# Patient Record
Sex: MALE | Race: WHITE | URBAN - METROPOLITAN AREA
[De-identification: names, ages, dates, MRNs, and addresses within clinical notes are randomized per-mention and may not be internally consistent; named-entity substitution may affect disease eponyms.]

---

## 2021-05-31 ENCOUNTER — RECORDS - HEALTHEAST (OUTPATIENT)
Dept: ADMINISTRATIVE | Facility: CLINIC | Age: 71
End: 2021-05-31

## 2021-06-01 ENCOUNTER — RECORDS - HEALTHEAST (OUTPATIENT)
Dept: ADMINISTRATIVE | Facility: CLINIC | Age: 71
End: 2021-06-01

## 2022-08-03 ENCOUNTER — TRANSFERRED RECORDS (OUTPATIENT)
Dept: HEALTH INFORMATION MANAGEMENT | Facility: CLINIC | Age: 72
End: 2022-08-03

## 2024-05-02 DIAGNOSIS — R60.9 EDEMA: Primary | ICD-10-CM

## 2025-02-17 ENCOUNTER — TELEPHONE (OUTPATIENT)
Dept: ORTHOPEDICS | Facility: CLINIC | Age: 75
End: 2025-02-17
Payer: COMMERCIAL

## 2025-02-17 ENCOUNTER — TRANSFERRED RECORDS (OUTPATIENT)
Dept: HEALTH INFORMATION MANAGEMENT | Facility: CLINIC | Age: 75
End: 2025-02-17
Payer: COMMERCIAL

## 2025-02-17 ENCOUNTER — MEDICAL CORRESPONDENCE (OUTPATIENT)
Dept: HEALTH INFORMATION MANAGEMENT | Facility: CLINIC | Age: 75
End: 2025-02-17
Payer: COMMERCIAL

## 2025-02-17 NOTE — TELEPHONE ENCOUNTER
Other: Patient is calling in for scheduling, patient has mass in left knee. Images and notes are incoming from [facility]- sent today per patient. Please call when able.      Could we send this information to you in [email or web address] or would you prefer to receive a phone call?:   Patient would prefer a phone call   Okay to leave a detailed message?: Yes at Home number on file [phone] (home)

## 2025-02-18 NOTE — PROGRESS NOTES
[facility] Physicians, Orthopaedic Oncology Surgery Consultation  by Nasim Narvaez M.D.    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  Rule out tenosynovial giant cell tumor (PVNS) versus occult synovial base disorder such as synovial chondromatosis or connective tissue disease.     Plan:  MRI examination for serial comparison to prior MRI of 2023.  If synovial base masses are still present or enlarging, then tissue biopsy is warranted.  Best done arthroscopically unless needle biopsy is possible.  Patient return in person or virtually for discussion of MRI results prior to decision about next steps.  We discussed the differential diagnosis of synovial based masses.      Nasim Narvaez MD  [facility] Professor  Oncology and Adult Reconstructive Surgery  Dept Orthopaedic Surgery, [facility]  [phone] office, [phone] pager  [email or web address]    This note was created using dictation software and may contain errors.  Please contact the creator for any clarifications that are needed.            History of Present Illness:   74 year old male  chief complaint    Right knee pain of 3 years duration, persistent, patient underwent MRI examination in 2023 and saw his primary care physicians assistant, Jesse Cao, who advised referral to [facility] orthopedics.  He saw an unknown physician at that point and was referred to [facility] for evaluation.  Apparently, the evaluation did not take place and patient return to see ALEXANDRIA Chahal again who sent the patient to [facility] who saw Dr. Pedro Recinos who again advised referral to [facility] orthopedic oncology resulting in today's evaluation.    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    Current symptoms:  Problem: Right knee mass  Onset and duration: several years  Awakens from sleep due to sx's:  No  Precipitating Injury:  No    Other joints or sites painful:  Yes  Fever: No  Appetite change or weight loss: No  History of prior or existing cancer: Yes             Physical Exam:     EXAMINATION pertinent findings:   PSYCH: Pleasant, healthy-appearing, alert, oriented x3, cooperative. Normal mood and affect.  VITAL SIGNS: There were no vitals taken for this visit..  Reviewed nursing intake notes.   There is no height or weight on file to calculate BMI.  RESP: non labored breathing   ABD: benign, soft, non-tender, no acute peritoneal findings  SKIN: grossly normal   LYMPHATIC: grossly normal, no adenopathy, no extremity edema  NEURO: grossly normal , no motor deficits  VASCULAR: satisfactory perfusion of all extremities   MUSCULOSKELETAL:   Normal gait.  Hips symmetrically pain-free range of motion, full.  Right knee: No effusion.  No warmth.  Tenderness to palpation of the suprapatellar region.  No varus or valgus laxity or deformity.  Full extension with further flexion to 130 degrees.    Left knee, no effusion, no warmth.  0 to 135 degree range of motion without ligamentous laxity.           Data:   All laboratory data reviewed  All imaging studies reviewed by me    MRI examination 2023 demonstrates numerous synovial base masses posterior to the lateral femoral condyle and intercondylar notch region.  No intra-articular meniscal or cruciate ligament pathology identified.      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    Also see scanned health assessment forms.       Past Surgical History:     Past Surgical History:   Procedure Laterality Date    [facility] TOTAL HIP ARTHROPLASTY      Description: Total Hip Replacement;  Recorded: 03/23/2012;            Social History:     Social History     Socioeconomic History    Marital status:      Spouse name: Not on file    Number of children: Not on file    Years of education: Not on file    Highest education level: Not on file   Occupational History    Not on file   Tobacco Use    Smoking status: Not on file    Smokeless tobacco: Not on file   Substance and Sexual Activity    Alcohol use: Not on file    Drug use: Not on file    Sexual activity: Not on file   Other Topics Concern    Not on file   Social History Narrative    Not on file     Social Drivers of Health     Financial Resource Strain: Not on file   Food Insecurity: Not on file   Transportation Needs: Not on file   Physical Activity: Not on file   Stress: Not on file   Social Connections: Not on file   Interpersonal Safety: Not on file   Housing Stability: Not on file            Family History:     No family history on file.         Medications:     No current outpatient medications on file.     No current facility-administered medications for this visit.              Review of Systems:   A comprehensive 10 point review of systems (constitutional, ENT, cardiac, peripheral vascular, lymphatic, respiratory, GI, , Musculoskeletal, skin, Neurological) was performed and found to be negative except as described in this note.     See intake form completed by patient

## 2025-02-18 NOTE — TELEPHONE ENCOUNTER
Action February 18, 2025 9:49 AM MT   Action Taken Sent a request for records from [facility].  Sent a request for imaging from [facility].       DIAGNOSIS: RIGHT KNEE MASS   APPOINTMENT DATE: 02/24/2025   NOTES STATUS DETAILS   OFFICE NOTE from referring provider In process [facility]   OFFICE NOTE from other specialist Care Everywhere 02/09/2024 - Jesse Cao PA-C - [facility]   MRI In process [facility]:  08/28/2023, 08/17/2023 - Right Knee   XRAYS (IMAGES & REPORTS) In process [facility]:  02/17/2025 - RT Knee    [facility]:  08/08/2023 - RT Knee

## 2025-02-24 ENCOUNTER — OFFICE VISIT (OUTPATIENT)
Dept: ORTHOPEDICS | Facility: CLINIC | Age: 75
End: 2025-02-24
Payer: COMMERCIAL

## 2025-02-24 ENCOUNTER — PRE VISIT (OUTPATIENT)
Dept: ORTHOPEDICS | Facility: CLINIC | Age: 75
End: 2025-02-24

## 2025-02-24 VITALS — HEIGHT: 72 IN | WEIGHT: 226 LBS | BODY MASS INDEX: 30.61 KG/M2

## 2025-02-24 DIAGNOSIS — R22.41 KNEE MASS, RIGHT: Primary | ICD-10-CM

## 2025-02-24 DIAGNOSIS — Z85.9 HISTORY OF CANCER: ICD-10-CM

## 2025-02-24 PROCEDURE — 99204 OFFICE O/P NEW MOD 45 MIN: CPT | Performed by: ORTHOPAEDIC SURGERY

## 2025-02-24 RX ORDER — VITAMIN B COMPLEX
25 TABLET ORAL
COMMUNITY

## 2025-02-24 RX ORDER — BUPROPION HYDROCHLORIDE 100 MG/1
100 TABLET ORAL
COMMUNITY

## 2025-02-24 RX ORDER — FUROSEMIDE 20 MG/1
TABLET ORAL
COMMUNITY
Start: 2025-02-11

## 2025-02-24 RX ORDER — ATORVASTATIN CALCIUM 10 MG/1
TABLET, FILM COATED ORAL
COMMUNITY

## 2025-02-24 RX ORDER — MIRTAZAPINE 30 MG/1
TABLET, FILM COATED ORAL
COMMUNITY

## 2025-02-24 RX ORDER — CELECOXIB 200 MG/1
200 CAPSULE ORAL
COMMUNITY

## 2025-02-24 RX ORDER — FINASTERIDE 5 MG/1
TABLET, FILM COATED ORAL
COMMUNITY
Start: 2024-07-23

## 2025-02-24 RX ORDER — FLUTICASONE PROPIONATE AND SALMETEROL 250; 50 UG/1; UG/1
1 POWDER RESPIRATORY (INHALATION)
COMMUNITY
Start: 2023-08-08

## 2025-02-24 RX ORDER — GABAPENTIN 100 MG/1
200 CAPSULE ORAL
COMMUNITY
Start: 2025-02-11

## 2025-02-24 NOTE — LETTER
2/24/2025      Hayden Cardona  [address]  [place] [address]      Dear Colleague,    Thank you for referring your patient, Hayden Cardona, to the [facility] [place]. Please see a copy of my visit note below.        [facility] Physicians, Orthopaedic Oncology Surgery Consultation  by Nasim Narvaez M.D.    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  Rule out tenosynovial giant cell tumor (PVNS) versus occult synovial base disorder such as synovial chondromatosis or connective tissue disease.     Plan:  MRI examination for serial comparison to prior MRI of 2023.  If synovial base masses are still present or enlarging, then tissue biopsy is warranted.  Best done arthroscopically unless needle biopsy is possible.  Patient return in person or virtually for discussion of MRI results prior to decision about next steps.  We discussed the differential diagnosis of synovial based masses.      Nasim Narvaez MD  [facility] Family Professor  Oncology and Adult Reconstructive Surgery  Dept Orthopaedic Surgery, [facility]  [phone] office, [phone] pager  [email or web address]    This note was created using dictation software and may contain errors.  Please contact the creator for any clarifications that are needed.            History of Present Illness:   74 year old male  chief complaint    Right knee pain of 3 years duration, persistent, patient underwent MRI examination in 2023 and saw his primary care physicians assistant, Jesse Cao, who advised referral to [facility] orthopedics.  He saw an unknown physician at that point and was referred to [facility] for evaluation.  Apparently, the evaluation did not take place and patient return to see Jesse Cornejo  PA again who sent the patient to [facility] who saw Dr. Pedro Recinos who again advised referral to [facility] orthopedic oncology resulting in today's evaluation.    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    Current symptoms:  Problem: Right knee mass  Onset and duration: several years  Awakens from sleep due to sx's:  No  Precipitating Injury:  No    Other joints or sites painful:  Yes  Fever: No  Appetite change or weight loss: No  History of prior or existing cancer: Yes             Physical Exam:     EXAMINATION pertinent findings:   PSYCH: Pleasant, healthy-appearing, alert, oriented x3, cooperative. Normal mood and affect.  VITAL SIGNS: There were no vitals taken for this visit..  Reviewed nursing intake notes.   There is no height or weight on file to calculate BMI.  RESP: non labored breathing   ABD: benign, soft, non-tender, no acute peritoneal findings  SKIN: grossly normal   LYMPHATIC: grossly normal, no adenopathy, no extremity edema  NEURO: grossly normal , no motor deficits  VASCULAR: satisfactory perfusion of all extremities   MUSCULOSKELETAL:   Normal gait.  Hips symmetrically pain-free range of motion, full.  Right knee: No effusion.  No warmth.  Tenderness to palpation of the suprapatellar region.  No varus or valgus laxity or deformity.  Full extension with further flexion to 130 degrees.    Left knee, no effusion, no warmth.  0 to 135 degree range of motion without ligamentous laxity.           Data:   All laboratory data reviewed  All imaging studies reviewed by me    MRI examination 2023 demonstrates numerous synovial base masses posterior to the lateral femoral condyle and intercondylar notch region.  No intra-articular meniscal or cruciate ligament pathology identified.      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    Also see scanned health assessment forms.       Past Surgical History:     Past Surgical History:   Procedure Laterality Date     [facility] TOTAL HIP ARTHROPLASTY      Description: Total Hip Replacement;  Recorded: 03/23/2012;            Social History:     Social History     Socioeconomic History     Marital status:      Spouse name: Not on file     Number of children: Not on file     Years of education: Not on file     Highest education level: Not on file   Occupational History     Not on file   Tobacco Use     Smoking status: Not on file     Smokeless tobacco: Not on file   Substance and Sexual Activity     Alcohol use: Not on file     Drug use: Not on file     Sexual activity: Not on file   Other Topics Concern     Not on file   Social History Narrative     Not on file     Social Drivers of Health     Financial Resource Strain: Not on file   Food Insecurity: Not on file   Transportation Needs: Not on file   Physical Activity: Not on file   Stress: Not on file   Social Connections: Not on file   Interpersonal Safety: Not on file   Housing Stability: Not on file            Family History:     No family history on file.         Medications:     No current outpatient medications on file.     No current facility-administered medications for this visit.              Review of Systems:   A comprehensive 10 point review of systems (constitutional, ENT, cardiac, peripheral vascular, lymphatic, respiratory, GI, , Musculoskeletal, skin, Neurological) was performed  and found to be negative except as described in this note.     See intake form completed by patient      Again, thank you for allowing me to participate in the care of your patient.        Sincerely,        Nasim Narvaez MD    Electronically signed

## 2025-03-10 NOTE — PROGRESS NOTES
Virtual Visit Details    Type of service:  Video Visit   Video Start Time:  1620  Video End Time:4:33 PM    Originating Location (pt. Location): Home    Distant Location (provider location):  On-site  Platform used for Video Visit: [facility], Orthopaedic Oncology Surgery Consultation  by Nasim Narvaez M.D.    Hayden Cardona MRN# [id number]    YOB: 1950     Requesting physician: Tulio Recinos MD [place] Dasha Cao PA-C       Diagnosis:  Right knee synovial mass versus cyst.    I met with Hayden abdi to discuss the MRI findings which reveal small effusion with a lateral sided cyst or mass present.  Diagnosis is unclear at this juncture.  He has had pain for 3 years in the knee joint.    I have recommended that we proceed with an arthroscopic examination, possible synovial biopsy, possible arthrotomy with excision of cyst or mass involving the lateral aspect of his knee joint.    We discussed the rationale for this recommendation, the need for diagnostic material.  We discussed risk benefits alternatives as well.    He would like to proceed with the surgery as outlined above.  Case request submitted.    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  Rule out tenosynovial giant cell tumor (PVNS) versus occult synovial base disorder such as synovial chondromatosis or connective tissue disease.     Plan:  MRI examination for serial comparison to prior MRI of 2023.  If synovial base masses are still present or enlarging, then tissue biopsy is warranted.  Best done arthroscopically unless needle biopsy is possible.  Patient return in person or virtually for discussion of MRI results prior to decision about  next steps.  We discussed the differential diagnosis of synovial based masses.      MD Tonia Orozco Family Professor  Oncology and Adult Reconstructive Surgery  Dept Orthopaedic Surgery, [facility]  [phone] office, [phone] pager  [email or web address]

## 2025-03-20 ENCOUNTER — VIRTUAL VISIT (OUTPATIENT)
Dept: ORTHOPEDICS | Facility: CLINIC | Age: 75
End: 2025-03-20
Attending: ORTHOPAEDIC SURGERY
Payer: COMMERCIAL

## 2025-03-20 DIAGNOSIS — R22.41 MASS OF KNEE, RIGHT: Primary | ICD-10-CM

## 2025-03-20 ASSESSMENT — PAIN SCALES - GENERAL: PAINLEVEL_OUTOF10: MILD PAIN (2)

## 2025-03-20 NOTE — LETTER
3/20/2025      Hayden Cardona  [address]  [place] [address]      Dear Colleague,    Thank you for referring your patient, Hayden Cardona, to the [facility] [place]. Please see a copy of my visit note below.    Virtual Visit Details    Type of service:  Video Visit   Video Start Time:  1620  Video End Time:4:33 PM    Originating Location (pt. Location): Home    Distant Location (provider location):  On-site  Platform used for Video Visit: [facility] Physicians, Orthopaedic Oncology Surgery Consultation  by Nasim Narvaez M.D.    Hayden Cardona MRN# [id number]    YOB: 1950     Requesting physician: Tulio Recinos MD [place] Dasha Cao PA-C       Diagnosis:  Right knee synovial mass versus cyst.    I met with Hayden abdi to discuss the MRI findings which reveal small effusion with a lateral sided cyst or mass present.  Diagnosis is unclear at this juncture.  He has had pain for 3 years in the knee joint.    I have recommended that we proceed with an arthroscopic examination, possible synovial biopsy, possible arthrotomy with excision of cyst or mass involving the lateral aspect of his knee joint.    We discussed the rationale for this recommendation, the need for diagnostic material.  We discussed risk benefits alternatives as well.    He would like to proceed with the surgery as outlined above.  Case request submitted.    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  Rule out tenosynovial giant cell tumor (PVNS) versus occult synovial base disorder such as synovial chondromatosis or connective tissue disease.     Plan:  MRI examination for serial comparison to  prior MRI of 2023.  If synovial base masses are still present or enlarging, then tissue biopsy is warranted.  Best done arthroscopically unless needle biopsy is possible.  Patient return in person or virtually for discussion of MRI results prior to decision about next steps.  We discussed the differential diagnosis of synovial based masses.      Nasim Narvaez MD  [facility] Family Professor  Oncology and Adult Reconstructive Surgery  Dept Orthopaedic Surgery, [facility]  [phone] office, [phone] pager  [email or web address]      Again, thank you for allowing me to participate in the care of your patient.        Sincerely,        Nasim Narvaez MD    Electronically signed

## 2025-03-20 NOTE — NURSING NOTE
Current patient location: [address] [place] [address]   [facility] [address]    Is the patient currently in the state of [place]? YES    Visit mode: VIDEO    If the visit is dropped, the patient can be reconnected by:VIDEO VISIT: Text to cell phone:   Telephone Information:   Mobile [phone]       Will anyone else be joining the visit? NO  (If patient encounters technical issues they should call [phone])    Are changes needed to the allergy or medication list? No    Are refills needed on medications prescribed by this physician? NO    Rooming Documentation:  Questionnaire(s) completed    Reason for visit: RECHECK    Anel NAIKF

## 2025-03-24 ENCOUNTER — TELEPHONE (OUTPATIENT)
Dept: ORTHOPEDICS | Facility: CLINIC | Age: 75
End: 2025-03-24
Payer: COMMERCIAL

## 2025-03-24 ENCOUNTER — MYC MEDICAL ADVICE (OUTPATIENT)
Dept: ORTHOPEDICS | Facility: CLINIC | Age: 75
End: 2025-03-24
Payer: COMMERCIAL

## 2025-03-24 NOTE — TELEPHONE ENCOUNTER
Other: Patient called and is ready to schedule surgery on his right knee. Please call patient back.     Could we send this information to you in [employer] or would you prefer to receive a phone call?:   Patient would prefer a phone call   Okay to leave a detailed message?: Yes at Cell number on file:    Telephone Information:   Mobile [phone]

## 2025-03-24 NOTE — TELEPHONE ENCOUNTER
Called patient to schedule surgery with Dr. Narvaez. Patient is interested in scheduling surgery on 4/8/25.    Patient will confirm that they have transportation available on that date and will call back or reply to [employer] message to confirm.    Rhonda  Perioperative   [phone]

## 2025-03-24 NOTE — TELEPHONE ENCOUNTER
Called patient to schedule surgery with Dr. Narvaez. Patient is currently driving and requested a call back.    Rhonda  Perioperative   [phone]

## 2025-03-24 NOTE — TELEPHONE ENCOUNTER
Patient is scheduled for surgery with Dr. Narvaez    Spoke with: patient    Date of Surgery: 4/8/25    Location: [facility] OR    Post op: 4/28/25    H&P: 4/2/25 PAC    Additional imaging/appointments: N/A    Surgery packet: received    Additional comments: N/A    Rhonda  Perioperative   [phone]

## 2025-03-25 NOTE — TELEPHONE ENCOUNTER
FUTURE VISIT INFORMATION      SURGERY INFORMATION:  Date: 25  Location: [facility] OR   Surgeon:  Nasim Narvaez MD   Anesthesia Type:  Choice  Procedure: ARTHROSCOPY KNEE WITH BIOPSY, SYNOVIUM, RIGHT KNEE; ARTHROTOMY, KNEE WITH EXCISION OF CYST OR MASS  Consult: 3/20/25    RECORDS REQUESTED FROM:       Primary Care Provider: Jesse Cao PA-C - [facility]     Pertinent Medical History: Hyperlipidemia     Most recent EKG+ Tracin24 - [facility]     Most recent ECHO: - [facility]     Most recent Cardiac Stress Test: 09

## 2025-03-25 NOTE — TELEPHONE ENCOUNTER
Teaching Flowsheet     Visit Type:     Time Start: 11:20am  Time End: 11:30am  Total Time Spent: 10 minutes    Surgeon: Dr. Narvaez  Location of Surgery (known or anticipated): [facility]  Type of Anesthesia: General    Pertinent Medical History: reviewed on the up to date problem list in the chart  Were medical conditions reviewed and appropriate for location? Yes  BMI: Obesity Grade I BMI 30-34.9    Relevant Diagnosis: Mass of knee, right   Teaching Topic: Arthroscopy with BIOPSY, SYNOVIUM, right KNEE, ARTHROTOMY, KNEE with excision of cyst or mass     Person(s) involved in teaching:   Patient  : No.     Caregiver//  Name: Man Cardona  Phone Number: [phone]   Relationship: Son  Consent to communicate on file No       Motivation Level:  Asks Questions: Yes  Eager to Learn: Yes  Cooperative: Yes  Receptive (willing/able to accept information): Yes  Any cultural factors/Confucianist beliefs that may influence understanding or compliance? No     Patient demonstrates understanding of the following:  Reason for the appointment, diagnosis and treatment plan: Yes  Knowledge of proper use of medications and conditions for which they are ordered (with special attention to potential side effects or drug interactions): Yes  Which situations necessitate calling provider and whom to contact: Yes     Teaching Concerns Addressed: all concerns addressed     Proper use and care of medical equip, care aids, etc.: Yes  Nutritional needs and diet plan: Yes  Pain management techniques: Yes  Wound Care: Yes  How and/when to access community resources: Yes  Need for pre-op with in 30 days: YES, to be done with PAC.      Does patient have difficulty getting a ride to appointments (post-ops, PT/OT): No     Instructional Materials Used/Given:  a surgery packet sent via [employer]. Instructed patient to buy or get two 8 ounces bottles of antiseptic surgical soap called 4% CHG. Common name brand of this soap are  Hibiclens and Exidine. I told them they can find this at their local pharmacy, clinic or retail store. If they have trouble finding it, I told them to ask their pharmacist to help them find a substitute.     - Important contact info/ phone numbers: emphasizing clinic number and after hours number  - Map/ location of surgery  - Showering instructions  - Stop light tool    Additionally the following was discussed with patient:  - Caregiver// listed above will be driving the patient to surgery and staying with them for 24 hours.       -Next step: surgery scheduled for 4/8 and pre-op scheduled with PAC for 4/2.

## 2025-04-01 ENCOUNTER — PREP FOR PROCEDURE (OUTPATIENT)
Dept: OTHER | Facility: CLINIC | Age: 75
End: 2025-04-01
Payer: COMMERCIAL

## 2025-04-02 ENCOUNTER — VIRTUAL VISIT (OUTPATIENT)
Dept: SURGERY | Facility: CLINIC | Age: 75
End: 2025-04-02
Payer: COMMERCIAL

## 2025-04-02 ENCOUNTER — ANESTHESIA EVENT (OUTPATIENT)
Dept: SURGERY | Facility: CLINIC | Age: 75
End: 2025-04-02
Payer: COMMERCIAL

## 2025-04-02 ENCOUNTER — PRE VISIT (OUTPATIENT)
Dept: SURGERY | Facility: CLINIC | Age: 75
End: 2025-04-02

## 2025-04-02 VITALS — BODY MASS INDEX: 30.61 KG/M2 | WEIGHT: 226 LBS | HEIGHT: 72 IN

## 2025-04-02 DIAGNOSIS — Z01.818 PREOP EXAMINATION: Primary | ICD-10-CM

## 2025-04-02 DIAGNOSIS — R22.41 MASS OF KNEE, RIGHT: ICD-10-CM

## 2025-04-02 ASSESSMENT — LIFESTYLE VARIABLES: TOBACCO_USE: 0

## 2025-04-02 ASSESSMENT — ENCOUNTER SYMPTOMS: SEIZURES: 0

## 2025-04-02 NOTE — PATIENT INSTRUCTIONS
Preparing for Your Surgery      Name:  Hayden Cardona   MRN:  [id number]   :  1950   Today's Date:  2025     The [place] [employer] [address] Construction Project                                Timeline 2025 -2025    This project will affect travel to the [facility], as well as the [facility].      Please check the [facility] [address] project website for the most up to date information and give yourself additional time to reach your destination.        Arriving for surgery:  Surgery date:  25  Arrival time:  7:30 am  Surgery time: 10:00 am    Please come to:     Please come to:       [facility] [address]   [address] [place]  [address]     The Green Ramp for patients and visitors is beneath the [facility]. The parking facility entrance is at the intersection of [address] [place] [address] and [address]. Patients and visitors who self-park will receive the reduced hospital parking rate (no ticket validation needed).     [employer] parking, located at the [facility] main entrance on [address] [place] [address], is available Monday - Friday from 7 am to 3:30 pm.     Discounted parking pass options can be purchased from  attendants during business hours.     -Check in at the security desk in the [facility] ([facility])   Lobby. They will direct you to the correct elevators.   -Proceed to the 3rd floor, Adult Surgery Waiting Lounge. [phone]     If you need directions, a wheelchair or escort please stop at the Information Desk in the lobby.  Inform the information person you are here for surgery; a wheelchair and escort to [address] will be provided.   An escort to the Adult Surgery Waiting Lounge will be provided. .    What can I eat or drink?  -  You may eat and drink normally up to 8 hours prior to  arrival time. (Until 11:30 pm on 4/7/25)  -  You may have clear liquids until 2 hours prior to arrival time. (Until 5:30 am on 4/8/25)    Examples of clear liquids:  Water  Clear broth  Juices (apple, white grape, white cranberry  and cider) without pulp  Noncarbonated, powder based beverages  (lemonade and Timothy-Aid)  Sodas (Sprite, 7-Up, ginger ale and seltzer)  Coffee or tea (without milk or cream)  Gatorade    -  No Alcohol or cannabis products for at least 24 hours before surgery.     Which medicines can I take?    Hold Aspirin for 7 days before surgery.   Hold Multivitamins for 7 days before surgery.  Hold Supplements for 7 days before surgery.  Hold Ibuprofen (Advil, Motrin) for 1 day(s) before surgery--unless otherwise directed by surgeon.  Hold Naproxen (Aleve) for 4 days before surgery.  Hold Celecoxib (Celebrex) for 3 days before surgery.    -  DO NOT take these medications the day of surgery:  Furosemide (Lasix)  Vitamin D    -  PLEASE TAKE these medications the day of surgery or per your usual routine:  Atorvastatin (Lipitor)  Bupropion (Wellbutrin)  Finasteride (Proscar)  Fluoxetine (Prozac)  Advair inhaler if needed and bring this with you day of surgery  Gabapentin (Neurontin)  Mirtazapine (Remeron)      How do I prepare myself?  - Please take 2 showers (one the night prior to surgery and one the morning of surgery) using Scrubcare or Hibiclens soap.    Use this soap only from the neck to your toes. Avoid genital area      Leave the soap on your skin for one minute--then rinse thoroughly.      You may use your own shampoo and conditioner. No other hair products.   - Please remove all jewelry and body piercings.  - No lotions, deodorants or fragrance.  - No makeup or fingernail polish.   - Bring your ID and insurance card.    -For patients being admitted to the [facility]  Family members are to take the patient belongings with them and place them in the lockers provided in the [facility].  Please limit the items you bring to 1 bag as the lockers are small.      -If you use a CPAP machine, please bring the CPAP machine, tubing, and mask to hospital.    -If you have a Deep Brain Stimulator, Spinal Cord Stimulator, or any Neuro Stimulator device---you must bring the remote control to the hospital.      ALL PATIENTS GOING HOME THE SAME DAY OF SURGERY ARE REQUIRED TO HAVE A RESPONSIBLE ADULT TO DRIVE AND BE IN ATTENDANCE WITH THEM FOR 24 HOURS FOLLOWING SURGERY.    Covid testing policy as of 12/06/2022  Your surgeon will notify and schedule you for a COVID test if one is needed before surgery--please direct any questions or COVID symptoms to your surgeon      Questions or Concerns:    - For any questions regarding the day of surgery or your hospital stay, please contact the Pre Admission Nursing Office at [phone].       - If you have health changes between today and your surgery, please call your surgeon.       - For questions after surgery, please call your surgeons office.           Current Visitor Guidelines    2 adult visitors for adult patients in the pre op area    If additional visitors come (beyond a patient care attendant or a group home caregiver), the additional visitors will be asked to wait in the main lobby of the hospital    Visiting hours: 8 a.m. to 8:30 p.m.    Patients confirmed or suspected to have symptoms of COVID 19 or flu:     No visitors allowed for adult patients.   Children (under age 18) can have 1 named visitor.     People who are sick or showing symptoms of COVID 19 or flu:    Are not allowed to visit patients--we can only make exceptions in special situations.       Please follow these guidelines for your visit:          Please maintain social distance          Masking is optional--however at times you may be asked to wear a mask for the safety of yourself and others     Clean your hands with alcohol hand . Do this when you arrive at and leave the  building and patient room,    And again after you touch your mask or anything in the room.     Go directly to and from the room you are visiting.     Stay in the patient s room during your visit. Limit going to other places in the hospital as much as possible     Leave bags and jackets at home or in the car.     For everyone s health, please don t come and go during your visit. That includes for smoking   during your visit.

## 2025-04-02 NOTE — H&P
Pre-Operative H & P     CC:  Preoperative exam to assess for increased cardiopulmonary risk while undergoing surgery and anesthesia.    Date of Encounter: 4/2/2025  Primary Care Physician:  Jesse Cao     Reason for visit:   Encounter Diagnoses   Name Primary?    Mass of knee, right Yes    Preop examination        HPI  Hayden Cardona is a 75 year old male who presents for pre-operative H & P in preparation for  Procedure Information       Case: [id number] Date/Time: 04/08/25 1000    Procedures:       Arthroscopy knee with (Right: Knee)      BIOPSY, SYNOVIUM, right KNEE (Right: Knee)      ARTHROTOMY, KNEE with excision of cyst or mass (Right: Knee)    Anesthesia type: Choice    Diagnosis: Mass of knee, right [R22.41]    Pre-op diagnosis: Mass of knee, right [R22.41]    Location: [facility]  / [facility]    Providers: Nasim Narvaez MD            Patient is being evaluated for comorbid conditions of neuropathy, depression, BPH, rosacea.    Mr. Cardona has a history of a synovial based mass of the R knee with persistent knee pain x 3 years. He has been counseled by Dr. Narvaez and now presents for the above procedure.      History is obtained from the patient and chart review    Hx of abnormal bleeding or anti-platelet use: denies      Past Medical History  Past Medical History:   Diagnosis Date    BPH (benign prostatic hyperplasia)     Depression     Elevated prostate specific antigen (PSA)     Mass of knee, right     Rosacea        Past Surgical History  Past Surgical History:   Procedure Laterality Date    TRANSRECTAL ULTRASOUND GUIDED PROSTATE BIOPSY  2019    TRANSRECTAL ULTRASOUND GUIDED PROSTATE BIOPSY  2021    [facility] TOTAL HIP ARTHROPLASTY      Description: Total Hip Replacement;  Recorded: 03/23/2012;       Prior to Admission Medications  Current Outpatient Medications   Medication Sig Dispense Refill    atorvastatin (LIPITOR) 10 MG tablet Take 10 mg by mouth every morning.      buPROPion (WELLBUTRIN) 100 MG tablet Take  100 mg by mouth every morning.      celecoxib (CELEBREX) 200 MG capsule Take 200 mg by mouth every morning.      finasteride (PROSCAR) 5 MG tablet Take 5 mg by mouth every morning.      FLUoxetine (PROZAC) 20 MG capsule Take 20 mg by mouth every morning.      fluticasone-salmeterol (ADVAIR) 250-50 MCG/ACT inhaler Inhale 1 puff into the lungs as needed.      furosemide (LASIX) 20 MG tablet Take 20 mg by mouth every morning.      gabapentin (NEURONTIN) 100 MG capsule Take 200 mg by mouth 2 times daily.      mirtazapine (REMERON) 30 MG tablet Take 30 mg by mouth at bedtime.      Vitamin D3 (VITAMIN D-1000 MAX ST) 25 mcg (1000 units) tablet Take 25 mcg by mouth every morning.         Allergies  Allergies   Allergen Reactions    Amoxicillin Nausea and Vomiting    Morphine Nausea       Social History  Social History     Socioeconomic History    Marital status:      Spouse name: Not on file    Number of children: Not on file    Years of education: Not on file    Highest education level: Not on file   Occupational History    Not on file   Tobacco Use    Smoking status: Never     Passive exposure: Never    Smokeless tobacco: Never   Substance and Sexual Activity    Alcohol use: Yes     Comment: 1-2 beer a week    Drug use: Never    Sexual activity: Not on file   Other Topics Concern    Not on file   Social History Narrative    Not on file     Social Drivers of Health     Financial Resource Strain: Not on file   Food Insecurity: Not on file   Transportation Needs: Not on file   Physical Activity: Not on file   Stress: Not on file   Social Connections: Not on file   Interpersonal Safety: Not on file   Housing Stability: Not on file       Family History  Family History   Adopted: Yes       Review of Systems  The complete review of systems is negative other than noted in the HPI or here.     Anesthesia Evaluation   Pt has had prior anesthetic.     History of anesthetic complications  - spinal headache.  (following hip  surgery).    ROS/MED HX  ENT/Pulmonary:     (+)                     Intermittent, asthma (Rarely uses inhaler)               (-) tobacco use, sleep apnea and recent URI   Neurologic:     (+)    peripheral neuropathy,                         (-) no seizures and no CVA   Cardiovascular: Comment: Takes lasix prn for edema (averages 1x/week)      METS/Exercise Tolerance: 3 - Able to walk 1-2 blocks without stopping Comment: Able to walk 2 blocks and ascend stairs w/ significant knee pain.   Hematologic:  - neg hematologic  ROS  (-) history of blood clots and history of blood transfusion   Musculoskeletal: Comment: Mass of right knee  Left knee pain    S/p SHAJI in 2000    B/L shoulder pain          GI/Hepatic:     (+) GERD (takes OTC meds prn),                (-) liver disease   Renal/Genitourinary:     (+)        BPH,   (-) renal disease   Endo:  - neg endo ROS  (-) Type II DM   Psychiatric/Substance Use:     (+) psychiatric history depression       Infectious Disease:  - neg infectious disease ROS     Malignancy:   (+) Malignancy (under surveillance), History of Prostate.    Other: Comment: Rosacea             Virtual visit -  No vitals were obtained    Physical Exam  Constitutional: Awake, alert, cooperative, no apparent distress, and appears stated age.  HENT: Normocephalic  Respiratory: non labored breathing   Neurologic: Awake, alert, oriented to name, place and time.   Neuropsychiatric: Calm, cooperative. Pleasant.  Normal affect.      Prior Labs/Diagnostic Studies   All pertinent records, results, labs and imaging personally reviewed     Preop labs:  CBC, BMP    Assessment    Hayedn Cardona is a 75 year old male seen as a PAC referral for risk assessment and optimization for anesthesia.    Plan/Recommendations  Pt will be optimized for the proposed procedure.  See below for details on the assessment, risk, and preoperative recommendations    NEUROLOGY  - No history of TIA, CVA or seizure    -Post Op delirium risk  factors:  No risk identified    ENT  - No current airway concerns.  Will need to be reassessed day of surgery.  Mallampati: Unable to assess  TM: Unable to assess    CARDIAC  - No history of CAD, Hypertension, and Afib  - Pt takes lasix prn for edema (averages 1x per week). Hold DOS.    - METS (Metabolic Equivalents)  Able to walk 2 blocks and ascend stairs w/ significant knee pain.    Patient CANNOT perform 4 METS exercise without symptoms             Total Score: 1    Functional Capacity: Unable to complete 4 METS      RCRI-Very low risk: Class 1 0.4% complication rate             Total Score: 0        PULMONARY  CRISS Low Risk             Total Score: 2    CRISS: Over 50 ys old    CRISS: Male      - Mild intermittent asthma. Rarely uses inhaler.    - Tobacco History    History   Smoking Status    Never   Smokeless Tobacco    Never       GI  - GERD, Controlled on medications: prn OTC meds    PONV Medium Risk  Total Score: 2           1 AN PONV: Patient is not a current smoker    1 AN PONV: Intended Post Op Opioids          - Elevated PSA, under close surveillance.    ENDOCRINE    - BMI: Estimated body mass index is 30.65 kg/m  as calculated from the following:    Height as of this encounter: 1.829 m (6').    Weight as of this encounter: 102.5 kg (226 lb).  Overweight (BMI 25.0-29.9)  - No history of Diabetes Mellitus    HEME  VTE Low Risk 0.5%             Total Score: 3    VTE: Greater than 59 yrs old    VTE: Male      - No history of abnormal bleeding or antiplatelet use.      MSK  Patient is NOT Frail             Total Score: 2    Frailty: Slower walking speed    Frailty: Decrease in strength      - Mass of right knee  - Left knee pain  - S/p SHAJI in 2000  - B/L shoulder pain  - Consider careful positioning throughout the perioperative period to prevent injury or exacerbation of pain.        PSYCH  - Pt expresses anxiety regarding above surgery. May benefit from anxiolytic in preop, if indicated. He would also prefer  GA or MAC for unawareness during the procedure. Will discuss w/ anesthesiologist DOS.      The patient is aware that the final anesthesia plan will be decided by the assigned anesthesia provider on the date of service.      The patient is optimized for their procedure. AVS with information on surgery time/arrival time, meds and NPO status given by nursing staff. No further diagnostic testing indicated.    Please refer to the physical examination documented by the anesthesiologist in the anesthesia record on the day of surgery.    Video-Visit Details    Type of service:  Video Visit    Provider received verbal consent for a Video Visit from the patient? Yes   Video Start Time:  0823  Video End Time: 0835    Originating Location (pt. Location): Home    Distant Location (provider location):  Off-site  Mode of Communication:  Video Conference via [employer]  On the day of service:     Prep time: 13 minutes  Visit time: 12 minutes  Documentation time: 15 minutes  ------------------------------------------  Total time: 40 minutes      Adelaide Forte PA-C  Preoperative Assessment Center  [facility]  Phone: [phone]  Fax: [phone]

## 2025-04-02 NOTE — PROGRESS NOTES
Brief: Right Knee arthroscopy     Patient Position (indicated by x):   x  Supine      Supine with torso rolled up on a bump      Floppy lateral on torso length bean bag      Lateral decubitus, bean bag, full length      Lateral decubitus, Wixson hip positioner      Safety paddle side supports x 2 clamped to side rail      Lithotomy, both legs in yellow padded leg bello      Lithotomy, single leg in yellow padded leg bello      Prone on blanket rolls/round gel pad      Prone on Antonio (arched) frame on Domenico table      Single thigh in orange arthroscopy clamp      Beach chair semi recumbent      Arm out on radiolucent arm table    x  Arthroscopy Lower Extremity drape      Revision SHAJI drape with plastic side bags for leg      Extremity drape      Shoulder pack drape      Palma catheter             Fracture Table      Domenico x-ray table    X  Regular OR table                  General Equipment Requests (indicated by x):     X  Arthroscopy tower and equiptment    X  Arthroscopy instrument tray(s)     Narvaez Biopsy trephine set w/ K-wire & pituitary rongeurs   X  Small pituitary rongeur (Holly)   x   18G Spinal c 30cc Syrgine c White Finger Loops/Assist      Bone graft, kapner gouges      Midas Asim Medtronic jasper, electric motor      Phenol 5%      Sheyenne BMAC stem cell      Vancomycin 1 gram powder      Zometa 4 mg vials      Depo Medrol steroid      Blunt Pelvic Retractor (.55, Blunt Hohmann with  slight bend)      (1) Portable hand held radiation detector machine for sentinel node biopsy and (2) Lymphazurin      Lambotte Osteotomes      Specimens and cultures (indicated by x):   x   Tissue cultures, aerobic and anaerobic without gram stain X5     Red and Lavender Top Bottles      pathology specimens - fresh   X   pathology specimens - formalin      Ricardo Jimenez MD  Adult Reconstruction Fellow  Dept Orthopaedic Surgery, [facility]

## 2025-04-02 NOTE — PROGRESS NOTES
Hayden is a 75 year old who is being evaluated via a billable video visit.      How would you like to obtain your AVS? MyChart          Subjective   Hayden is a 75 year old, presenting for the following health issues:  Pre-Op Exam (/)          ANGY Juarez LPN

## 2025-04-03 ENCOUNTER — LAB (OUTPATIENT)
Dept: LAB | Facility: CLINIC | Age: 75
End: 2025-04-03
Payer: COMMERCIAL

## 2025-04-03 DIAGNOSIS — Z01.818 PREOP EXAMINATION: ICD-10-CM

## 2025-04-03 DIAGNOSIS — R22.41 MASS OF KNEE, RIGHT: ICD-10-CM

## 2025-04-03 LAB
ANION GAP SERPL CALCULATED.3IONS-SCNC: 14 MMOL/L (ref 7–15)
BUN SERPL-MCNC: 17.4 MG/DL (ref 8–23)
CALCIUM SERPL-MCNC: 9.5 MG/DL (ref 8.8–10.4)
CHLORIDE SERPL-SCNC: 101 MMOL/L (ref 98–107)
CREAT SERPL-MCNC: 1.06 MG/DL (ref 0.67–1.17)
EGFRCR SERPLBLD CKD-EPI 2021: 74 ML/MIN/1.73M2
ERYTHROCYTE [DISTWIDTH] IN BLOOD BY AUTOMATED COUNT: 12.1 % (ref 10–15)
GLUCOSE SERPL-MCNC: 88 MG/DL (ref 70–99)
HCO3 SERPL-SCNC: 23 MMOL/L (ref 22–29)
HCT VFR BLD AUTO: 46 % (ref 40–53)
HGB BLD-MCNC: 16.5 G/DL (ref 13.3–17.7)
MCH RBC QN AUTO: 31.7 PG (ref 26.5–33)
MCHC RBC AUTO-ENTMCNC: 35.9 G/DL (ref 31.5–36.5)
MCV RBC AUTO: 88 FL (ref 78–100)
PLATELET # BLD AUTO: 157 10E3/UL (ref 150–450)
POTASSIUM SERPL-SCNC: 4.1 MMOL/L (ref 3.4–5.3)
RBC # BLD AUTO: 5.21 10E6/UL (ref 4.4–5.9)
SODIUM SERPL-SCNC: 138 MMOL/L (ref 135–145)
WBC # BLD AUTO: 7.5 10E3/UL (ref 4–11)

## 2025-04-08 ENCOUNTER — APPOINTMENT (OUTPATIENT)
Dept: GENERAL RADIOLOGY | Facility: CLINIC | Age: 75
End: 2025-04-08
Attending: ORTHOPAEDIC SURGERY
Payer: COMMERCIAL

## 2025-04-08 ENCOUNTER — ANESTHESIA (OUTPATIENT)
Dept: SURGERY | Facility: CLINIC | Age: 75
End: 2025-04-08
Payer: COMMERCIAL

## 2025-04-08 ENCOUNTER — HOSPITAL ENCOUNTER (OUTPATIENT)
Facility: CLINIC | Age: 75
Discharge: HOME | End: 2025-04-08
Attending: ORTHOPAEDIC SURGERY | Admitting: ORTHOPAEDIC SURGERY
Payer: COMMERCIAL

## 2025-04-08 VITALS
WEIGHT: 220.46 LBS | BODY MASS INDEX: 29.86 KG/M2 | TEMPERATURE: 97.6 F | SYSTOLIC BLOOD PRESSURE: 123 MMHG | RESPIRATION RATE: 15 BRPM | HEIGHT: 72 IN | HEART RATE: 87 BPM | DIASTOLIC BLOOD PRESSURE: 67 MMHG | OXYGEN SATURATION: 93 %

## 2025-04-08 DIAGNOSIS — Z98.890 S/P RIGHT KNEE ARTHROSCOPY: Primary | ICD-10-CM

## 2025-04-08 PROCEDURE — 250N000011 HC RX IP 250 OP 636: Mod: JZ | Performed by: ANESTHESIOLOGY

## 2025-04-08 PROCEDURE — 250N000013 HC RX MED GY IP 250 OP 250 PS 637: Performed by: ANESTHESIOLOGY

## 2025-04-08 PROCEDURE — 258N000003 HC RX IP 258 OP 636: Performed by: NURSE ANESTHETIST, CERTIFIED REGISTERED

## 2025-04-08 PROCEDURE — 272N000001 HC OR GENERAL SUPPLY STERILE: Performed by: ORTHOPAEDIC SURGERY

## 2025-04-08 PROCEDURE — 710N000012 HC RECOVERY PHASE 2, PER MINUTE: Performed by: ORTHOPAEDIC SURGERY

## 2025-04-08 PROCEDURE — 88304 TISSUE EXAM BY PATHOLOGIST: CPT | Mod: TC | Performed by: ORTHOPAEDIC SURGERY

## 2025-04-08 PROCEDURE — 258N000001 HC RX 258: Performed by: ORTHOPAEDIC SURGERY

## 2025-04-08 PROCEDURE — 250N000025 HC SEVOFLURANE, PER MIN: Performed by: ORTHOPAEDIC SURGERY

## 2025-04-08 PROCEDURE — 360N000076 HC SURGERY LEVEL 3, PER MIN: Performed by: ORTHOPAEDIC SURGERY

## 2025-04-08 PROCEDURE — 999N000180 XR SURGERY CARM FLUORO LESS THAN 5 MIN

## 2025-04-08 PROCEDURE — 250N000009 HC RX 250: Mod: JZ | Performed by: NURSE ANESTHETIST, CERTIFIED REGISTERED

## 2025-04-08 PROCEDURE — 250N000013 HC RX MED GY IP 250 OP 250 PS 637: Performed by: PHYSICIAN ASSISTANT

## 2025-04-08 PROCEDURE — 88304 TISSUE EXAM BY PATHOLOGIST: CPT | Mod: 26 | Performed by: STUDENT IN AN ORGANIZED HEALTH CARE EDUCATION/TRAINING PROGRAM

## 2025-04-08 PROCEDURE — 88305 TISSUE EXAM BY PATHOLOGIST: CPT | Mod: 26 | Performed by: STUDENT IN AN ORGANIZED HEALTH CARE EDUCATION/TRAINING PROGRAM

## 2025-04-08 PROCEDURE — 370N000017 HC ANESTHESIA TECHNICAL FEE, PER MIN: Performed by: ORTHOPAEDIC SURGERY

## 2025-04-08 PROCEDURE — 250N000013 HC RX MED GY IP 250 OP 250 PS 637

## 2025-04-08 PROCEDURE — 250N000011 HC RX IP 250 OP 636: Performed by: PHYSICIAN ASSISTANT

## 2025-04-08 PROCEDURE — 250N000011 HC RX IP 250 OP 636: Performed by: NURSE ANESTHETIST, CERTIFIED REGISTERED

## 2025-04-08 PROCEDURE — 250N000009 HC RX 250: Performed by: ORTHOPAEDIC SURGERY

## 2025-04-08 PROCEDURE — 710N000010 HC RECOVERY PHASE 1, LEVEL 2, PER MIN: Performed by: ORTHOPAEDIC SURGERY

## 2025-04-08 PROCEDURE — 999N000141 HC STATISTIC PRE-PROCEDURE NURSING ASSESSMENT: Performed by: ORTHOPAEDIC SURGERY

## 2025-04-08 RX ORDER — ACETAMINOPHEN 325 MG/1
650 TABLET ORAL
Status: DISCONTINUED | OUTPATIENT
Start: 2025-04-08 | End: 2025-04-08 | Stop reason: HOSPADM

## 2025-04-08 RX ORDER — ACETAMINOPHEN 325 MG/1
650 TABLET ORAL EVERY 4 HOURS PRN
Qty: 50 TABLET | Refills: 0 | Status: SHIPPED | OUTPATIENT
Start: 2025-04-08

## 2025-04-08 RX ORDER — SODIUM CHLORIDE, SODIUM LACTATE, POTASSIUM CHLORIDE, CALCIUM CHLORIDE 600; 310; 30; 20 MG/100ML; MG/100ML; MG/100ML; MG/100ML
INJECTION, SOLUTION INTRAVENOUS CONTINUOUS
Status: DISCONTINUED | OUTPATIENT
Start: 2025-04-08 | End: 2025-04-08 | Stop reason: HOSPADM

## 2025-04-08 RX ORDER — AMOXICILLIN 250 MG
1-2 CAPSULE ORAL 2 TIMES DAILY
Qty: 30 TABLET | Refills: 0 | Status: SHIPPED | OUTPATIENT
Start: 2025-04-08

## 2025-04-08 RX ORDER — ACETAMINOPHEN 325 MG/1
975 TABLET ORAL ONCE
Status: COMPLETED | OUTPATIENT
Start: 2025-04-08 | End: 2025-04-08

## 2025-04-08 RX ORDER — CEFAZOLIN SODIUM/WATER 2 G/20 ML
2 SYRINGE (ML) INTRAVENOUS SEE ADMIN INSTRUCTIONS
Status: DISCONTINUED | OUTPATIENT
Start: 2025-04-08 | End: 2025-04-08 | Stop reason: HOSPADM

## 2025-04-08 RX ORDER — ONDANSETRON 4 MG/1
4 TABLET, ORALLY DISINTEGRATING ORAL EVERY 8 HOURS PRN
Qty: 4 TABLET | Refills: 0 | Status: SHIPPED | OUTPATIENT
Start: 2025-04-08

## 2025-04-08 RX ORDER — ONDANSETRON 4 MG/1
4 TABLET, ORALLY DISINTEGRATING ORAL EVERY 30 MIN PRN
Status: DISCONTINUED | OUTPATIENT
Start: 2025-04-08 | End: 2025-04-08 | Stop reason: HOSPADM

## 2025-04-08 RX ORDER — EPHEDRINE SULFATE 50 MG/ML
INJECTION, SOLUTION INTRAMUSCULAR; INTRAVENOUS; SUBCUTANEOUS PRN
Status: DISCONTINUED | OUTPATIENT
Start: 2025-04-08 | End: 2025-04-08

## 2025-04-08 RX ORDER — HYDROMORPHONE HYDROCHLORIDE 1 MG/ML
0.4 INJECTION, SOLUTION INTRAMUSCULAR; INTRAVENOUS; SUBCUTANEOUS EVERY 5 MIN PRN
Status: DISCONTINUED | OUTPATIENT
Start: 2025-04-08 | End: 2025-04-08 | Stop reason: HOSPADM

## 2025-04-08 RX ORDER — FENTANYL CITRATE 50 UG/ML
25 INJECTION, SOLUTION INTRAMUSCULAR; INTRAVENOUS EVERY 5 MIN PRN
Status: DISCONTINUED | OUTPATIENT
Start: 2025-04-08 | End: 2025-04-08 | Stop reason: HOSPADM

## 2025-04-08 RX ORDER — DEXAMETHASONE SODIUM PHOSPHATE 4 MG/ML
4 INJECTION, SOLUTION INTRA-ARTICULAR; INTRALESIONAL; INTRAMUSCULAR; INTRAVENOUS; SOFT TISSUE
Status: DISCONTINUED | OUTPATIENT
Start: 2025-04-08 | End: 2025-04-08 | Stop reason: HOSPADM

## 2025-04-08 RX ORDER — KETOROLAC TROMETHAMINE 30 MG/ML
30 INJECTION, SOLUTION INTRAMUSCULAR; INTRAVENOUS EVERY 6 HOURS PRN
Status: DISCONTINUED | OUTPATIENT
Start: 2025-04-08 | End: 2025-04-08 | Stop reason: HOSPADM

## 2025-04-08 RX ORDER — FENTANYL CITRATE 50 UG/ML
50 INJECTION, SOLUTION INTRAMUSCULAR; INTRAVENOUS EVERY 5 MIN PRN
Status: DISCONTINUED | OUTPATIENT
Start: 2025-04-08 | End: 2025-04-08 | Stop reason: HOSPADM

## 2025-04-08 RX ORDER — BUPIVACAINE HYDROCHLORIDE AND EPINEPHRINE 2.5; 5 MG/ML; UG/ML
INJECTION, SOLUTION INFILTRATION; PERINEURAL PRN
Status: DISCONTINUED | OUTPATIENT
Start: 2025-04-08 | End: 2025-04-08 | Stop reason: HOSPADM

## 2025-04-08 RX ORDER — ONDANSETRON 4 MG/1
4 TABLET, ORALLY DISINTEGRATING ORAL
Status: DISCONTINUED | OUTPATIENT
Start: 2025-04-08 | End: 2025-04-08 | Stop reason: HOSPADM

## 2025-04-08 RX ORDER — OXYCODONE HYDROCHLORIDE 5 MG/1
5 TABLET ORAL
Status: COMPLETED | OUTPATIENT
Start: 2025-04-08 | End: 2025-04-08

## 2025-04-08 RX ORDER — ONDANSETRON 2 MG/ML
4 INJECTION INTRAMUSCULAR; INTRAVENOUS EVERY 30 MIN PRN
Status: DISCONTINUED | OUTPATIENT
Start: 2025-04-08 | End: 2025-04-08 | Stop reason: HOSPADM

## 2025-04-08 RX ORDER — SODIUM CHLORIDE, SODIUM LACTATE, POTASSIUM CHLORIDE, CALCIUM CHLORIDE 600; 310; 30; 20 MG/100ML; MG/100ML; MG/100ML; MG/100ML
INJECTION, SOLUTION INTRAVENOUS CONTINUOUS PRN
Status: DISCONTINUED | OUTPATIENT
Start: 2025-04-08 | End: 2025-04-08

## 2025-04-08 RX ORDER — PROPOFOL 10 MG/ML
INJECTION, EMULSION INTRAVENOUS CONTINUOUS PRN
Status: DISCONTINUED | OUTPATIENT
Start: 2025-04-08 | End: 2025-04-08

## 2025-04-08 RX ORDER — NALOXONE HYDROCHLORIDE 0.4 MG/ML
0.1 INJECTION, SOLUTION INTRAMUSCULAR; INTRAVENOUS; SUBCUTANEOUS
Status: DISCONTINUED | OUTPATIENT
Start: 2025-04-08 | End: 2025-04-08 | Stop reason: HOSPADM

## 2025-04-08 RX ORDER — ONDANSETRON 2 MG/ML
INJECTION INTRAMUSCULAR; INTRAVENOUS PRN
Status: DISCONTINUED | OUTPATIENT
Start: 2025-04-08 | End: 2025-04-08

## 2025-04-08 RX ORDER — IBUPROFEN 600 MG/1
600 TABLET, FILM COATED ORAL EVERY 6 HOURS PRN
Qty: 30 TABLET | Refills: 0 | Status: SHIPPED | OUTPATIENT
Start: 2025-04-08

## 2025-04-08 RX ORDER — CEFAZOLIN SODIUM/WATER 2 G/20 ML
2 SYRINGE (ML) INTRAVENOUS
Status: COMPLETED | OUTPATIENT
Start: 2025-04-08 | End: 2025-04-08

## 2025-04-08 RX ORDER — LIDOCAINE HYDROCHLORIDE 20 MG/ML
INJECTION, SOLUTION INFILTRATION; PERINEURAL PRN
Status: DISCONTINUED | OUTPATIENT
Start: 2025-04-08 | End: 2025-04-08

## 2025-04-08 RX ORDER — PROPOFOL 10 MG/ML
INJECTION, EMULSION INTRAVENOUS PRN
Status: DISCONTINUED | OUTPATIENT
Start: 2025-04-08 | End: 2025-04-08

## 2025-04-08 RX ORDER — DEXAMETHASONE SODIUM PHOSPHATE 4 MG/ML
INJECTION, SOLUTION INTRA-ARTICULAR; INTRALESIONAL; INTRAMUSCULAR; INTRAVENOUS; SOFT TISSUE PRN
Status: DISCONTINUED | OUTPATIENT
Start: 2025-04-08 | End: 2025-04-08

## 2025-04-08 RX ORDER — HYDROMORPHONE HYDROCHLORIDE 1 MG/ML
0.2 INJECTION, SOLUTION INTRAMUSCULAR; INTRAVENOUS; SUBCUTANEOUS EVERY 5 MIN PRN
Status: DISCONTINUED | OUTPATIENT
Start: 2025-04-08 | End: 2025-04-08 | Stop reason: HOSPADM

## 2025-04-08 RX ORDER — OXYCODONE HYDROCHLORIDE 5 MG/1
5-10 TABLET ORAL EVERY 4 HOURS PRN
Qty: 10 TABLET | Refills: 0 | Status: SHIPPED | OUTPATIENT
Start: 2025-04-08

## 2025-04-08 RX ORDER — DEXMEDETOMIDINE HYDROCHLORIDE 4 UG/ML
INJECTION, SOLUTION INTRAVENOUS PRN
Status: DISCONTINUED | OUTPATIENT
Start: 2025-04-08 | End: 2025-04-08

## 2025-04-08 RX ORDER — FENTANYL CITRATE 50 UG/ML
INJECTION, SOLUTION INTRAMUSCULAR; INTRAVENOUS PRN
Status: DISCONTINUED | OUTPATIENT
Start: 2025-04-08 | End: 2025-04-08

## 2025-04-08 RX ADMIN — DEXMEDETOMIDINE HYDROCHLORIDE 4 MCG: 100 INJECTION, SOLUTION INTRAVENOUS at 11:45

## 2025-04-08 RX ADMIN — PROPOFOL 150 MG: 10 INJECTION, EMULSION INTRAVENOUS at 10:41

## 2025-04-08 RX ADMIN — SODIUM CHLORIDE, SODIUM LACTATE, POTASSIUM CHLORIDE, AND CALCIUM CHLORIDE: .6; .31; .03; .02 INJECTION, SOLUTION INTRAVENOUS at 10:30

## 2025-04-08 RX ADMIN — EPHEDRINE SULFATE 10 MG: 5 INJECTION INTRAVENOUS at 11:01

## 2025-04-08 RX ADMIN — PHENYLEPHRINE HYDROCHLORIDE 100 MCG: 10 INJECTION INTRAVENOUS at 12:25

## 2025-04-08 RX ADMIN — DEXAMETHASONE SODIUM PHOSPHATE 8 MG: 4 INJECTION, SOLUTION INTRAMUSCULAR; INTRAVENOUS at 10:41

## 2025-04-08 RX ADMIN — KETOROLAC TROMETHAMINE 30 MG: 30 INJECTION, SOLUTION INTRAMUSCULAR at 15:12

## 2025-04-08 RX ADMIN — HYDROMORPHONE HYDROCHLORIDE 0.2 MG: 1 INJECTION, SOLUTION INTRAMUSCULAR; INTRAVENOUS; SUBCUTANEOUS at 13:46

## 2025-04-08 RX ADMIN — HYDROMORPHONE HYDROCHLORIDE 0.5 MG: 1 INJECTION, SOLUTION INTRAMUSCULAR; INTRAVENOUS; SUBCUTANEOUS at 11:14

## 2025-04-08 RX ADMIN — FENTANYL CITRATE 25 MCG: 50 INJECTION, SOLUTION INTRAMUSCULAR; INTRAVENOUS at 13:26

## 2025-04-08 RX ADMIN — DEXMEDETOMIDINE HYDROCHLORIDE 8 MCG: 100 INJECTION, SOLUTION INTRAVENOUS at 11:19

## 2025-04-08 RX ADMIN — EPHEDRINE SULFATE 10 MG: 5 INJECTION INTRAVENOUS at 10:44

## 2025-04-08 RX ADMIN — FENTANYL CITRATE 100 MCG: 50 INJECTION INTRAMUSCULAR; INTRAVENOUS at 10:30

## 2025-04-08 RX ADMIN — Medication 2 G: at 10:41

## 2025-04-08 RX ADMIN — DEXMEDETOMIDINE HYDROCHLORIDE 8 MCG: 100 INJECTION, SOLUTION INTRAVENOUS at 11:10

## 2025-04-08 RX ADMIN — PHENYLEPHRINE HYDROCHLORIDE 200 MCG: 10 INJECTION INTRAVENOUS at 10:47

## 2025-04-08 RX ADMIN — ONDANSETRON 4 MG: 2 INJECTION INTRAMUSCULAR; INTRAVENOUS at 10:41

## 2025-04-08 RX ADMIN — LIDOCAINE HYDROCHLORIDE 100 MG: 20 INJECTION, SOLUTION INFILTRATION; PERINEURAL at 10:41

## 2025-04-08 RX ADMIN — ACETAMINOPHEN 650 MG: 325 TABLET, FILM COATED ORAL at 16:23

## 2025-04-08 RX ADMIN — Medication 1 LOZENGE: at 15:07

## 2025-04-08 RX ADMIN — ACETAMINOPHEN 975 MG: 325 TABLET, FILM COATED ORAL at 08:08

## 2025-04-08 RX ADMIN — EPHEDRINE SULFATE 5 MG: 5 INJECTION INTRAVENOUS at 10:51

## 2025-04-08 RX ADMIN — SODIUM CHLORIDE, SODIUM LACTATE, POTASSIUM CHLORIDE, AND CALCIUM CHLORIDE: .6; .31; .03; .02 INJECTION, SOLUTION INTRAVENOUS at 12:30

## 2025-04-08 RX ADMIN — OXYCODONE 5 MG: 5 TABLET ORAL at 15:12

## 2025-04-08 RX ADMIN — PHENYLEPHRINE HYDROCHLORIDE 100 MCG: 10 INJECTION INTRAVENOUS at 11:01

## 2025-04-08 RX ADMIN — PHENYLEPHRINE HYDROCHLORIDE 100 MCG: 10 INJECTION INTRAVENOUS at 10:52

## 2025-04-08 RX ADMIN — HYDROMORPHONE HYDROCHLORIDE 0.2 MG: 1 INJECTION, SOLUTION INTRAMUSCULAR; INTRAVENOUS; SUBCUTANEOUS at 13:39

## 2025-04-08 RX ADMIN — PROPOFOL 75 MCG/KG/MIN: 10 INJECTION, EMULSION INTRAVENOUS at 10:43

## 2025-04-08 RX ADMIN — OXYCODONE HYDROCHLORIDE 5 MG: 5 TABLET ORAL at 13:51

## 2025-04-08 RX ADMIN — PHENYLEPHRINE HYDROCHLORIDE 100 MCG: 10 INJECTION INTRAVENOUS at 12:14

## 2025-04-08 RX ADMIN — PHENYLEPHRINE HYDROCHLORIDE 100 MCG: 10 INJECTION INTRAVENOUS at 12:39

## 2025-04-08 RX ADMIN — FENTANYL CITRATE 25 MCG: 50 INJECTION, SOLUTION INTRAMUSCULAR; INTRAVENOUS at 13:31

## 2025-04-08 ASSESSMENT — ACTIVITIES OF DAILY LIVING (ADL)
ADLS_ACUITY_SCORE: 35
ADLS_ACUITY_SCORE: 36
ADLS_ACUITY_SCORE: 35
ADLS_ACUITY_SCORE: 37
ADLS_ACUITY_SCORE: 36

## 2025-04-08 NOTE — DISCHARGE INSTRUCTIONS
To contact a doctor, call Dr. DEE Narvaez, Orthopedic, [phone]  or:     [phone] and ask for the Resident On Call for:          Orthopedics (answered 24 hours a day)   Emergency Departments:  [facility] Adult Emergency Department: [phone]     [facility] Children's Emergency Department: [phone]

## 2025-04-08 NOTE — BRIEF OP NOTE
Orthopaedic Surgery Brief Op-Note      Patient: Hayden Cardona  : 1950  Date of Service: 2025 12:52 PM    Pre-operative Diagnosis: Mass of knee, right [R22.41]  Post-operative Diagnosis: same    Procedure(s) Performed: Procedure(s):  Arthroscopy knee with  BIOPSY, SYNOVIUM, right KNEE  ARTHROTOMY, KNEE with excision of mass    Staff: Dr. Narvaez  Assistants:   Stefan Starr PA-C    Anesthesia: General  EBL: <10 cc  UOP: see anesthesia record  Tourniquet Time: 60 minutes at 250 mmHg    Implants:   * No implants in log *  Drains: none  Intra-op Labs/Cxs/Specimens:   ID Type Source Tests Collected by Time Destination   1 : right knee synovium Tissue Knee, Right SURGICAL PATHOLOGY EXAM Nasim Narvaez MD 2025 11:40 AM    2 : Right Lateral Extraarticular Knee Mass Tissue Knee, Right SURGICAL PATHOLOGY EXAM Nasim Narvaez MD 2025 12:01 PM      Complications: No apparent complications during procedure  Findings: Please see dictated operative note for details    Disposition: Stable to PACU, then discharge home today.     Post-Op Plan:  Assessment/Plan: Hayden Cardona is a 75 year old male s/p Procedure(s):  Arthroscopy knee with  BIOPSY, SYNOVIUM, right KNEE  ARTHROTOMY, KNEE with excision of mass on 2025 with Dr. Narvaze.    Activity: Up with assist and assistive devices as needed until independent.   Weight bearing status: WBAT   Antibiotics:  na  Diet: Begin with clear fluids and progress diet as tolerated. Bowel regimen. Anti-emetics PRN.    DVT prophylaxis:  NA  Elevation: OK    Wound Care: Alginate, tegaderm to be removed 7 days post op  Drains: None  Palma: none  Pain management: Orals PRN, IV for breakthrough only  X-rays: none  Physical Therapy: none   Occupational Therapy: none   Labs: none   Cultures: PATHOLOGY PENDING  Consults: none     Future Appointments   Date Time Provider Department Center   2025  8:00 AM Ryan Olmstead PA-C [facility]       Disposition:  Pending  progress with pain control on orals, and medical stability, anticipate discharge to Home today.  Follow up: Plan for follow up with Ryan Olmstead PA-C in x2 weeks    I assisted with positioning, prepping and draping, and closure.      Stefan Starr PA-C  4/8/2025 12:52 PM  Physician Assistant   Oncology and Adult Reconstructive Surgery  Dept Orthopaedic Surgery, [facility]     Thank you for allowing me to participate in this patient's care. Please page me directly any questions/concerns.   Securely message with the Vocera Web Console (learn more here)  Text page via [employer] Paging/Directory    If there is no response, if it is a weekend, or if it is during evening hours, please page the orthopaedic surgery resident on call via [facility] Paging/Directory

## 2025-04-08 NOTE — ANESTHESIA POSTPROCEDURE EVALUATION
Patient: Hayden Cardona    Procedure: Procedure(s):  Arthroscopy knee with  BIOPSY, SYNOVIUM, right KNEE  ARTHROTOMY, KNEE with excision of mass       Anesthesia Type:  General    Note:  Disposition: Outpatient   Postop Pain Control: Uneventful            Sign Out: Well controlled pain   PONV: No   Neuro/Psych: Uneventful            Sign Out: Acceptable/Baseline neuro status   Airway/Respiratory: Uneventful            Sign Out: Acceptable/Baseline resp. status   CV/Hemodynamics: Uneventful            Sign Out: Acceptable CV status; No obvious hypovolemia; No obvious fluid overload   Other NRE:    DID A NON-ROUTINE EVENT OCCUR?            Last vitals:  Vitals Value Taken Time   /71 04/08/25 1445   Temp 36.6  C (97.9  F) 04/08/25 1302   Pulse 87 04/08/25 1317   Resp 14 04/08/25 1430   SpO2 96 % 04/08/25 1447   Vitals shown include unfiled device data.    Electronically Signed By: Marielle Mendes MD  April 8, 2025  3:57 PM

## 2025-04-08 NOTE — ANESTHESIA CARE TRANSFER NOTE
Patient: Hayden Cardona    Procedure: Procedure(s):  Arthroscopy knee with  BIOPSY, SYNOVIUM, right KNEE  ARTHROTOMY, KNEE with excision of mass       Diagnosis: Mass of knee, right [R22.41]  Diagnosis Additional Information: No value filed.    Anesthesia Type:   General     Note:    Oropharynx: oropharynx clear of all foreign objects and spontaneously breathing  Level of Consciousness: drowsy  Oxygen Supplementation: face mask  Level of Supplemental Oxygen (L/min / FiO2): 6  Independent Airway: airway patency satisfactory and stable  Dentition: dentition unchanged  Vital Signs Stable: post-procedure vital signs reviewed and stable  Report to RN Given: handoff report given  Patient transferred to: PACU  Comments: Pt had LMA removed in the OR without incident or complications. Pt VSS upon arrival to the PACU. Pt has no c\o pain\N\V. Pt care report given to receiving RN.   +  Handoff Report: Identifed the Patient, Identified the Reponsible Provider, Reviewed the pertinent medical history, Discussed the surgical course, Reviewed Intra-OP anesthesia mangement and issues during anesthesia, Set expectations for post-procedure period and Allowed opportunity for questions and acknowledgement of understanding      Vitals:  Vitals Value Taken Time   /79 04/08/25 1303   Temp     Pulse 87 04/08/25 1307   Resp 11 04/08/25 1307   SpO2 98 % 04/08/25 1307   Vitals shown include unfiled device data.    Electronically Signed By: EMERSON Chandra CRNA  April 8, 2025  1:08 PM

## 2025-04-08 NOTE — ANESTHESIA PROCEDURE NOTES
Airway       Patient location during procedure: OR  Staff -        CRNA: Andrew Powers APRN CRNA       Performed By: CRNA  Consent for Airway        Urgency: elective  Indications and Patient Condition       Indications for airway management: maira-procedural       Induction type:intravenous       Mask difficulty assessment: 0 - not attempted    Final Airway Details       Final airway type: supraglottic airway    Supraglottic Airway Details        Type: LMA       Brand: I-Gel       LMA size: 5    Post intubation assessment        Placement verified by: capnometry, equal breath sounds and chest rise        Number of attempts at approach: 1       Number of other approaches attempted: 0       Secured with: tape       Ease of procedure: easy       Dentition: Intact and Unchanged

## 2025-04-08 NOTE — ANESTHESIA PREPROCEDURE EVALUATION
Anesthesia Pre-Procedure Evaluation    Patient: Hayden Cardona   MRN: [id number] : 1950        Procedure : Procedure(s):  Arthroscopy knee with  BIOPSY, SYNOVIUM, right KNEE  ARTHROTOMY, KNEE with excision of cyst or mass          Past Medical History:   Diagnosis Date    BPH (benign prostatic hyperplasia)     Depression     Elevated prostate specific antigen (PSA)     Mass of knee, right     Rosacea       Past Surgical History:   Procedure Laterality Date    TRANSRECTAL ULTRASOUND GUIDED PROSTATE BIOPSY      TRANSRECTAL ULTRASOUND GUIDED PROSTATE BIOPSY      [facility] TOTAL HIP ARTHROPLASTY      Description: Total Hip Replacement;  Recorded: 2012;      Allergies   Allergen Reactions    Amoxicillin Nausea and Vomiting    Morphine Nausea      Social History     Tobacco Use    Smoking status: Never     Passive exposure: Never    Smokeless tobacco: Never   Substance Use Topics    Alcohol use: Yes     Comment: 1-2 beer a week      Wt Readings from Last 1 Encounters:   25 100 kg (220 lb 7.4 oz)        Anesthesia Evaluation   Pt has had prior anesthetic. Type: General and MAC.        ROS/MED HX  ENT/Pulmonary:  - neg pulmonary ROS     Neurologic:  - neg neurologic ROS     Cardiovascular:  - neg cardiovascular ROS     METS/Exercise Tolerance:     Hematologic:  - neg hematologic  ROS     Musculoskeletal:  - neg musculoskeletal ROS     GI/Hepatic:  - neg GI/hepatic ROS     Renal/Genitourinary:  - neg Renal ROS     Endo:  - neg endo ROS     Psychiatric/Substance Use:  - neg psychiatric ROS     Infectious Disease:  - neg infectious disease ROS     Malignancy:  - neg malignancy ROS     Other:            Physical Exam    Airway        Mallampati: II   TM distance: > 3 FB   Neck ROM: full   Mouth opening: > 3 cm    Respiratory Devices and Support         Dental       (+) Minor Abnormalities - some fillings, tiny chips      Cardiovascular   cardiovascular exam normal          Pulmonary   pulmonary exam normal   "              OUTSIDE LABS:  CBC:   Lab Results   Component Value Date    WBC 7.5 04/03/2025    HGB 16.5 04/03/2025    HCT 46.0 04/03/2025     04/03/2025     BMP:   Lab Results   Component Value Date     04/03/2025    POTASSIUM 4.1 04/03/2025    CHLORIDE 101 04/03/2025    CO2 23 04/03/2025    BUN 17.4 04/03/2025    CR 1.06 04/03/2025    GLC 88 04/03/2025     COAGS: No results found for: \"PTT\", \"INR\", \"FIBR\"  POC: No results found for: \"BGM\", \"HCG\", \"HCGS\"  HEPATIC: No results found for: \"ALBUMIN\", \"PROTTOTAL\", \"ALT\", \"AST\", \"GGT\", \"ALKPHOS\", \"BILITOTAL\", \"BILIDIRECT\", \"ALVARO\"  OTHER:   Lab Results   Component Value Date    SHERLY 9.5 04/03/2025       Anesthesia Plan    ASA Status:  2    NPO Status:  NPO Appropriate    Anesthesia Type: General.     - Airway: LMA   Induction: Intravenous.   Maintenance: Balanced.        Consents    Anesthesia Plan(s) and associated risks, benefits, and realistic alternatives discussed. Questions answered and patient/representative(s) expressed understanding.     - Discussed: Risks, Benefits and Alternatives for the PROCEDURE were discussed     - Discussed with:       - Extended Intubation/Ventilatory Support Discussed: No.      - Patient is DNR/DNI Status: No     Use of blood products discussed: No .     Postoperative Care    Pain management: Multi-modal analgesia.   PONV prophylaxis: Dexamethasone or Solumedrol     Comments:    Other Comments: GA with LMA           Marielle Mendes MD    Clinically Significant Risk Factors Present on Admission                             # Overweight: Estimated body mass index is 29.9 kg/m  as calculated from the following:    Height as of this encounter: 1.829 m (6').    Weight as of this encounter: 100 kg (220 lb 7.4 oz).                "

## 2025-04-10 ENCOUNTER — TELEPHONE (OUTPATIENT)
Dept: OTHER | Facility: CLINIC | Age: 75
End: 2025-04-10
Payer: COMMERCIAL

## 2025-04-10 NOTE — TELEPHONE ENCOUNTER
Spoke with this patient today on the telephone.  The patient had some questions regarding the dressings and Ace wrap compression.  Encouraged patient to use Ace wrap compression and elevate the operative extremity to decrease swelling as tolerated.  Furthermore instructed the patient that he may shower as long as the dressings are covered with waterproof covering.  Strongly discourage taking any baths until discussed with his operative surgeon.

## 2025-04-15 LAB
PATH REPORT.COMMENTS IMP SPEC: NORMAL
PATH REPORT.FINAL DX SPEC: NORMAL
PATH REPORT.GROSS SPEC: NORMAL
PATH REPORT.MICROSCOPIC SPEC OTHER STN: NORMAL
PATH REPORT.RELEVANT HX SPEC: NORMAL
PHOTO IMAGE: NORMAL

## 2025-04-17 NOTE — PROGRESS NOTES
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  Residual underlying pain secondary to grade 4 osteoarthritis of the knee most notably in the medial and patellofemoral joint.     Plan:  After examined the patient and reviewing the imaging and labs I since discussed my findings with him.  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  Most importantly it is benign.  Either way it is likely secondary to his underlying degenerative osteoarthritis.  Conservative treatments for osteoarthritis were reviewed including activity modification, bracing, anti-inflammatories, steroid therapy and total joint arthroplasty.  If the patient would like a brace or a corticosteroid injection he will reach out.  I would like him to watch for signs of local recurrence in regards to the synovial mass.  No formal surveillance required.  The incisions are healing well so we can now get them wet but should avoid submersion for 2 weeks.  He can use the knee as tolerated but should avoid high impact exercises as this will exacerbate and make the progression of the osteoarthritis faster.  He should take NSAIDs for pain.  All questions were answered the patient will follow-up on an as-needed basis.     Ryan Olmstead PA-C  Physician Assistant   Oncology and Adult Reconstructive Surgery  Dept Orthopaedic Surgery, [facility]             History of Present  Illness:   75 year old male who presents today 20 days status post the above-mentioned procedure.  His pain is well-controlled.  He states his preoperative pain has been exacerbated since surgery.  He denies any chest pain shortness of breath or calf pain.           Physical Exam:     EXAMINATION pertinent findings:   PSYCH: Pleasant, healthy-appearing, alert, oriented x3, cooperative. Normal mood and affect.  VITAL SIGNS: There were no vitals taken for this visit..  Reviewed nursing intake notes.   There is no height or weight on file to calculate BMI.  RESP: non labored breathing   ABD: benign, soft, non-tender, no acute peritoneal findings  SKIN: grossly normal   LYMPHATIC: grossly normal, no adenopathy, no extremity edema  NEURO: grossly normal , no motor deficits  VASCULAR: satisfactory perfusion of all extremities   MUSCULOSKELETAL:     Right knee:   The incision is clean, dry, and intact with no erythema, dehiscence, or discharge.  Range of motion 0 to 110 degrees.  Minor joint effusion.  No peripheral edema.  Calves are soft nontender with negative Homans' sign.    Right LE:              Thigh and leg compartments soft and compressible              +Quad/TA/GSC/FHL/EHL              SILT DP/SP/Jean Claude/Saph/Tib nerve distributions              Palpable dorsalis pedis pulse              Data:   All laboratory data reviewed  All imaging studies reviewed by me     Surgical Pathology Exam: [id number]  Order: [id number]  Collected 4/8/2025 11:40 AM       Status: Final result       Visible to patient: Yes (seen)    1 Result Note       1 Patient Communication       Component  Resulting Agency   Case Report   Surgical Pathology Report                         Case: [id number]                                   Authorizing Provider:  Nasim Narvaez MD        Collected:           04/08/2025 11:40 AM           Ordering Location:      MAIN OR                 Received:            04/08/2025 02:36 PM           Pathologist:            Richard García MD                                                                 Specimens:   A) - Knee, Right, right knee synovium                                                                B) - Knee, Right, Right Lateral Extraarticular Knee Mass                                  Final Diagnosis   A. Synovium, right knee, excision:  - Synovium with patchy mild hyperplasia, including some papillary forms containing benign adipose tissue, and nonspecific patchy mild chronic inflammation, consistent with synovial lipomatosis.   - See comment.     B. Soft tissue mass, right lateral extraarticular Knee, excision:  - Most consistent with complex ganglion cyst with multifocal chondroid/cartilaginous metaplasia.  - See comment.   Electronically signed by Richard García MD on 4/15/2025 at 12:51 PM   Comment  [id number]   The overall histologic findings in both specimens A and B most likely represent degenerative changes. The histologic findings in specimen A has been referred to as synovial lipomatosis, which is pseudotumor of synovium possibly due to fat deposition and could occur in the setting of degenerative articular diseases of joints. Specimen B shows benign fibroadipose and vascular tissue, with synovium lined spaces and multifocal pseudocysts with fibrous wall and myxoid change, as well as multifocal cartilaginous metaplasia.     Slides were also reviewed by Dr. Pietro Steinberg (Bone and soft tissue pathologist).           MR right knee without and with IV contrast 3/12/2025 8:20 AM     Techniques: Multiplanar multisequence imaging of the right knee  without and with IV contrast. Contrast: 10 mL Gadavist.     History: History of cancer     Comparison: Outside MRI 8/28/2023, 8/17/2023, outside radiographs  2/17/2025     Findings:     MENISCI:  Medial meniscus: Intact.  Lateral meniscus: Intact.     LIGAMENTS  Cruciate ligaments: Intact.  Medial supporting structures: Intact.  Lateral supporting structures: Intact.      EXTENSOR MECHANISM  Intact. Distal quadriceps tendinosis.     FLUID  Trace joint effusion with scattered joint bodies. No substantial  Baker's cyst.     OSSEOUS and ARTICULAR STRUCTURES  Bones: No fracture, contusion, or osseous lesion is seen.     Patellofemoral compartment: Full-thickness cartilage loss over the  median ridge and lateral patellar facet with mild subchondral edema.  Full-thickness cartilage loss over the lateral trochlear facet with  subchondral cysts, similar to prior.     Medial compartment: Full-thickness cartilage defect over the anterior  weightbearing medial femoral condyle with mild subchondral edema,  similar to prior.     Lateral compartment: No hyaline cartilage disease.     ANCILLARY FINDINGS  Multilobular T2 hyperintense, T1 hypointense, partially enhancing  lesion along the distal lateral femoral metaphysis, deep to the distal  adductor david and iliotibial band today measures 2.6 x 3.1 x 2.5 cm  on series 103 image 12 and series 107 image 21, previously 2.8 x 3.4 x  3.1 cm when measured in a similar manner on prior outside MRI  8/17/2023.                                                                      Impression:     1. Mildly decreased size of multilobular, partially enhancing lesion  along the distal posterior lateral femoral metaphysis when compared to  prior MRI 8/28/2023. Given stability in size this likely represents a  complex ganglion cyst.     2. 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     RENAN SAENZ MD (Joe)

## 2025-04-23 NOTE — OP NOTE
OPERATIVE NOTE    Date of Service: 4/8/2025 12:52 PM     Pre-operative Diagnosis: Mass of knee, right [R22.41]  Post-operative Diagnosis: same     Procedure(s) Performed: Procedure(s):  Arthroscopy knee with  Arthroscopic BIOPSY, SYNOVIUM, right KNEE  ARTHROTOMY, KNEE with excision of mass (separate incision)     Staff: Dr. Narvaez  Assistants:   Stefan Starr PA-C       This surgical procedure required surgical assistance.  A physician's assistant provided surgical assistance as there was no qualified trainee, resident or fellow, available to provide surgical assistance services.      Anesthesia: General  EBL: <10 cc  UOP: see anesthesia record  Tourniquet Time: 60 minutes at 250 mmHg     Implants:   * No implants in log *  Drains: none  Intra-op Labs/Cxs/Specimens:   ID Type Source Tests Collected by Time Destination   1 : right knee synovium Tissue Knee, Right SURGICAL PATHOLOGY EXAM Nasim Narvaez MD 4/8/2025 11:40 AM     2 : Right Lateral Extraarticular Knee Mass Tissue Knee, Right SURGICAL PATHOLOGY EXAM Nasim Narvaez MD 4/8/2025 12:01 PM        Complications: No apparent complications during procedure    Indications: This patient has had knee symptoms and discomfort and MRI examination of soft tissue mass with abnormal nature involving the area adjacent to the lateral femoral condyle.    Procedure details: The patient was placed on the operating table in supine position.  The right lower extremity was then prepped and draped in sterile manner and tourniquet placed about the proximal thigh.    Arthroscopic examination was performed through the standard arthroscopy portals.  Intraoperative dissection was performed and multiple intraoperative photographs were taken to document findings.  Synovial tissue was encountered that was mildly hyperemic.  No extensive papillary or nodular masses were encountered.  Nonetheless, multiple samples of the synovium which appear to be hypertrophic were obtained and sent  to pathology for examination.    The cruciate ligaments as well as the menisci appeared intact without evidence of damage.  Patient had evidence of degenerative changes involving the patellofemoral joint.    I then made a decision to proceed with open excision exploration of the posterior lateral aspect of the femur proximal to the condyle as there is evidence on MRI of abnormal signal in this area but it could not be accessible or identified arthroscopically.    Therefore, an incision longitudinally was made along the posterior aspect of the lateral femoral condyle through the tensor fascia baljeet and taken down through the subcutaneous tissue.  Care is taken to avoid any injury to the biceps tendon and therefore avoiding injury to the peroneal nerve as well.  I then dissected along the posterior lateral aspect of the condyle using the MRI as a guide Damián.  Intraoperative fluoroscopic examination was used to confirm the proper location level of the expiration.  Significant tissue and scar tissue encountered but no large cyst mass was encountered.  Abnormal tissue was biopsied and sample was sent to pathology for examination.  The arthrotomy was opened in this area as well to confirm that was adequately explored.    Once this was completed, the wound was thoroughly irrigated.  Tourniquet was deflated and hemostasis was secured.  Wound closure was accomplished in layers with standard technique.  The arthroscopy portal sites were closed as well.     Disposition: Stable to PACU, then discharge home today.      Post-Op Plan:  Assessment/Plan: Hayden Cardona is a 75 year old male s/p Procedure(s):  Arthroscopy knee with  BIOPSY, SYNOVIUM, right KNEE  ARTHROTOMY, KNEE with excision of mass on 4/8/2025 with Dr. Narvaez.     Activity: Up with assist and assistive devices as needed until independent.   Weight bearing status: WBAT   Antibiotics:  na  Diet: Begin with clear fluids and progress diet as tolerated. Bowel regimen.  Anti-emetics PRN.    DVT prophylaxis:  NA  Elevation: OK    Wound Care: Alginate, tegaderm to be removed 7 days post op  Drains: None  Palma: none  Pain management: Orals PRN, IV for breakthrough only  X-rays: none  Physical Therapy: none   Occupational Therapy: none   Labs: none   Cultures: PATHOLOGY PENDING  Consults: none             Future Appointments   Date Time Provider Department Center   4/28/2025  8:00 AM Ryan Olmstead PA-C [facility]         Disposition:  Pending progress with pain control on orals, and medical stability, anticipate discharge to Home today.  Follow up: Plan for follow up with Ryan Olmstead PA-C in x2 weeks      Nasim Narvaez MD  [facility] Family Professor  Oncology and Adult Reconstructive Surgery  Dept Orthopaedic Surgery, [facility]  [phone] office, [phone] pager  [email or web address]

## 2025-04-23 NOTE — RESULT ENCOUNTER NOTE
Dear Hayden Cardona:    Just a note to inform you that I did review the pathology report which revealed no evidence of malignancy or significant neoplasm involving your knee.  There is no need for any additional treatment.  The pathology findings confirm absence of cancer or malignant tumor.  Furthermore, no aggressive benign tumors are identified either.  I believe the findings on your MRI are explained by a fluid filled cyst, also known as a synovial cyst or ganglion cyst of a complex nature that has subsequently subsided and decompressed spontaneously.    Some moderate arthritis was noticed in the kneecap area and likely is responsible for his symptoms.    You may resume your regular activities without restriction.  If you continue to have progressive pain of a debilitating nature, I would be happy to meet with you and discuss further interventions that may or may not be helpful.  I will also be in the clinic at the time of your follow-up visit with Ryan Olmstead, physicians assistant in case issues to have any additional questions or concerns.    Best regards,    MD Tonia Orozco Family Professor  Oncology and Adult Reconstructive Surgery  Dept Orthopaedic Surgery, [facility]  [phone] office  [email or web address]

## 2025-04-24 ENCOUNTER — DOCUMENTATION ONLY (OUTPATIENT)
Dept: OTHER | Facility: CLINIC | Age: 75
End: 2025-04-24
Payer: COMMERCIAL

## 2025-04-28 ENCOUNTER — OFFICE VISIT (OUTPATIENT)
Dept: ORTHOPEDICS | Facility: CLINIC | Age: 75
End: 2025-04-28
Payer: COMMERCIAL

## 2025-04-28 DIAGNOSIS — R22.41 MASS OF KNEE, RIGHT: Primary | ICD-10-CM

## 2025-04-28 NOTE — LETTER
4/28/2025      Hayden Cardona  [address]  [facility] [address]      Dear Colleague,    Thank you for referring your patient, Hayden Cardona, to the [facility] [place]. Please see a copy of my visit note below.        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  Residual underlying pain secondary to grade 4 osteoarthritis of the knee most notably in the medial and patellofemoral joint.     Plan:  After examined the patient and reviewing the imaging and labs I since discussed my findings with him.  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  Most importantly it is benign.  Either way it is likely secondary to his underlying degenerative osteoarthritis.  Conservative treatments for osteoarthritis were reviewed including activity modification, bracing, anti-inflammatories, steroid therapy and total joint arthroplasty.  If the patient would like a brace or a corticosteroid injection he will reach out.  I would like him to watch for signs of local recurrence in regards to the synovial mass.  No formal surveillance required.  The incisions are healing well so we can now get them wet but should avoid submersion for 2 weeks.  He can use the knee as tolerated but should avoid high impact exercises as this will exacerbate and make the progression of the osteoarthritis faster.  He should take NSAIDs  for pain.  All questions were answered the patient will follow-up on an as-needed basis.     Ryan Olmstead PA-C  Physician Assistant   Oncology and Adult Reconstructive Surgery  Dept Orthopaedic Surgery, [facility]             History of Present Illness:   75 year old male who presents today 20 days status post the above-mentioned procedure.  His pain is well-controlled.  He states his preoperative pain has been exacerbated since surgery.  He denies any chest pain shortness of breath or calf pain.           Physical Exam:     EXAMINATION pertinent findings:   PSYCH: Pleasant, healthy-appearing, alert, oriented x3, cooperative. Normal mood and affect.  VITAL SIGNS: There were no vitals taken for this visit..  Reviewed nursing intake notes.   There is no height or weight on file to calculate BMI.  RESP: non labored breathing   ABD: benign, soft, non-tender, no acute peritoneal findings  SKIN: grossly normal   LYMPHATIC: grossly normal, no adenopathy, no extremity edema  NEURO: grossly normal , no motor deficits  VASCULAR: satisfactory perfusion of all extremities   MUSCULOSKELETAL:     Right knee:   The incision is clean, dry, and intact with no erythema, dehiscence, or discharge.  Range of motion 0 to 110 degrees.  Minor joint effusion.  No peripheral edema.  Calves are soft nontender with negative Homans' sign.    Right LE:              Thigh and leg compartments soft and compressible              +Quad/TA/GSC/FHL/EHL              SILT DP/SP/Jean Claude/Saph/Tib nerve distributions              Palpable dorsalis pedis pulse              Data:   All laboratory data reviewed  All imaging studies reviewed by me     Surgical Pathology Exam: [id number]  Order: [id number]  Collected 4/8/2025 11:40 AM       Status: Final result       Visible to patient: Yes (seen)    1 Result Note       1 Patient Communication       Component  Resulting Agency   Case Report   Surgical Pathology Report                         Case:  [id number]                                   Authorizing Provider:  Nasim Narvaez MD        Collected:           04/08/2025 11:40 AM           Ordering Location:     [facility] OR                 Received:            04/08/2025 02:36 PM           Pathologist:           Richard García MD                                                                 Specimens:   A) - Knee, Right, right knee synovium                                                                B) - Knee, Right, Right Lateral Extraarticular Knee Mass                                  Final Diagnosis   A. Synovium, right knee, excision:  - Synovium with patchy mild hyperplasia, including some papillary forms containing benign adipose tissue, and nonspecific patchy mild chronic inflammation, consistent with synovial lipomatosis.   - See comment.     B. Soft tissue mass, right lateral extraarticular Knee, excision:  - Most consistent with complex ganglion cyst with multifocal chondroid/cartilaginous metaplasia.  - See comment.   Electronically signed by Richard García MD on 4/15/2025 at 12:51 PM   Comment  [id number]   The overall histologic findings in both specimens A and B most likely represent degenerative changes. The histologic findings in specimen A has been referred to as synovial lipomatosis, which is pseudotumor of synovium possibly due to fat deposition and could occur in the setting of degenerative articular diseases of joints. Specimen B shows benign fibroadipose and vascular tissue, with synovium lined spaces and multifocal pseudocysts with fibrous wall and myxoid change, as well as multifocal cartilaginous metaplasia.     Slides were also reviewed by Dr. Pietro Steinberg (Bone and soft tissue pathologist).           MR right knee without and with IV contrast 3/12/2025 8:20 AM     Techniques: Multiplanar multisequence imaging of the right knee  without and with IV contrast. Contrast: 10 mL Gadavist.     History: History of cancer     Comparison: Outside  MRI 8/28/2023, 8/17/2023, outside radiographs  2/17/2025     Findings:     MENISCI:  Medial meniscus: Intact.  Lateral meniscus: Intact.     LIGAMENTS  Cruciate ligaments: Intact.  Medial supporting structures: Intact.  Lateral supporting structures: Intact.     EXTENSOR MECHANISM  Intact. Distal quadriceps tendinosis.     FLUID  Trace joint effusion with scattered joint bodies. No substantial  Baker's cyst.     OSSEOUS and ARTICULAR STRUCTURES  Bones: No fracture, contusion, or osseous lesion is seen.     Patellofemoral compartment: Full-thickness cartilage loss over the  median ridge and lateral patellar facet with mild subchondral edema.  Full-thickness cartilage loss over the lateral trochlear facet with  subchondral cysts, similar to prior.     Medial compartment: Full-thickness cartilage defect over the anterior  weightbearing medial femoral condyle with mild subchondral edema,  similar to prior.     Lateral compartment: No hyaline cartilage disease.     ANCILLARY FINDINGS  Multilobular T2 hyperintense, T1 hypointense, partially enhancing  lesion along the distal lateral femoral metaphysis, deep to the distal  adductor david and iliotibial band today measures 2.6 x 3.1 x 2.5 cm  on series 103 image 12 and series 107 image 21, previously 2.8 x 3.4 x  3.1 cm when measured in a similar manner on prior outside MRI  8/17/2023.                                                                      Impression:     1. Mildly decreased size of multilobular, partially enhancing lesion  along the distal posterior lateral femoral metaphysis when compared to  prior MRI 8/28/2023. Given stability in size this likely represents a  complex ganglion cyst.     2. 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     RENAN SAENZ MD (Joe)       Again, thank you for allowing me to participate in the care of your patient.        Sincerely,        Ryan Olmstead PA-C    Electronically  signed

## (undated) DEVICE — SU DERMABOND ADVANCED .7ML DNX12

## (undated) DEVICE — COVER CAMERA IN-LIGHT DISP LT-C02

## (undated) DEVICE — STRAP KNEE/BODY 31143004

## (undated) DEVICE — PREP DURAPREP REMOVER 4OZ 8611

## (undated) DEVICE — PREP POVIDONE-IODINE 7.5% SCRUB 4OZ BOTTLE MDS093945

## (undated) DEVICE — SOLUTION IRRIGATION 0.9% NACL 1000ML R5200-01

## (undated) DEVICE — PAD FLOOR WATERPROOF 24"X40" FM0102P

## (undated) DEVICE — SU SILK 2-0 TIE 12X30" A305H

## (undated) DEVICE — SOLUTION IV IRRIGATION 0.9% NACL 3L R8206

## (undated) DEVICE — GOWN IMPERVIOUS SPECIALTY XLG/XLONG 32474

## (undated) DEVICE — SOLUTION WATER 1000ML R5000-01

## (undated) DEVICE — ESU PENCIL W/HOLSTER E2350H

## (undated) DEVICE — SU ETHILON 3-0 PS-2 18" 1669H

## (undated) DEVICE — DRAPE C-ARM W/STRAPS 42X72" 07-CA104

## (undated) DEVICE — SU PDS II 3-0 PS-1 18" Z683G

## (undated) DEVICE — GLOVE BIOGEL PI ULTRATOUCH SZ 7.0 41170

## (undated) DEVICE — BNDG ELASTIC 6" DBL LENGTH UNSTERILE 6611-16

## (undated) DEVICE — SYR 05ML LL W/O NDL

## (undated) DEVICE — PREP DURAPREP 26ML APL 8630

## (undated) DEVICE — LINEN BACK PACK 5440

## (undated) DEVICE — LINEN TOWEL PACK X5 5464

## (undated) DEVICE — GLOVE BIOGEL PI MICRO INDICATOR UNDERGLOVE SZ 7.5 48975

## (undated) DEVICE — TRAY PREP DRY SKIN SCRUB 067

## (undated) DEVICE — SU PDS II 2-0 CT-2 27"  Z333H

## (undated) DEVICE — LINEN GOWN OVERSIZE 5408

## (undated) DEVICE — SU VICRYL 0 CT-1 27" J340H

## (undated) DEVICE — NDL 18GA 1.5" 305196

## (undated) DEVICE — LINEN DRAPE 54X72" 5467

## (undated) DEVICE — SPECIMEN CONTAINER W/10% BUFFERED FORMALIN 120ML 591201

## (undated) DEVICE — TUBING ARTHROSCOPY PUMP ARTHREX AR-6410

## (undated) DEVICE — Device

## (undated) DEVICE — SUCTION MANIFOLD NEPTUNE 2 SYS 4 PORT 0702-020-000

## (undated) DEVICE — ESU GROUND PAD UNIVERSAL W/O CORD

## (undated) DEVICE — TOURNIQUET CUFF 30" REPRO BLUE 60-7070-105

## (undated) DEVICE — DRAPE MAYO STAND 23X54 8337

## (undated) DEVICE — SUTURE VICRYL+ 2-0 CT-2 27" UND VCP269H

## (undated) RX ORDER — PROPOFOL 10 MG/ML
INJECTION, EMULSION INTRAVENOUS
Status: DISPENSED
Start: 2025-04-08

## (undated) RX ORDER — EPHEDRINE SULFATE 50 MG/ML
INJECTION, SOLUTION INTRAMUSCULAR; INTRAVENOUS; SUBCUTANEOUS
Status: DISPENSED
Start: 2025-04-08

## (undated) RX ORDER — ACETAMINOPHEN 325 MG/1
TABLET ORAL
Status: DISPENSED
Start: 2025-04-08

## (undated) RX ORDER — OXYCODONE HYDROCHLORIDE 5 MG/1
TABLET ORAL
Status: DISPENSED
Start: 2025-04-08

## (undated) RX ORDER — KETOROLAC TROMETHAMINE 30 MG/ML
INJECTION, SOLUTION INTRAMUSCULAR; INTRAVENOUS
Status: DISPENSED
Start: 2025-04-08

## (undated) RX ORDER — HYDROMORPHONE HYDROCHLORIDE 1 MG/ML
INJECTION, SOLUTION INTRAMUSCULAR; INTRAVENOUS; SUBCUTANEOUS
Status: DISPENSED
Start: 2025-04-08

## (undated) RX ORDER — FENTANYL CITRATE 50 UG/ML
INJECTION, SOLUTION INTRAMUSCULAR; INTRAVENOUS
Status: DISPENSED
Start: 2025-04-08

## (undated) RX ORDER — CEFAZOLIN SODIUM/WATER 2 G/20 ML
SYRINGE (ML) INTRAVENOUS
Status: DISPENSED
Start: 2025-04-08